# Patient Record
Sex: FEMALE | Race: OTHER | NOT HISPANIC OR LATINO | URBAN - METROPOLITAN AREA
[De-identification: names, ages, dates, MRNs, and addresses within clinical notes are randomized per-mention and may not be internally consistent; named-entity substitution may affect disease eponyms.]

---

## 2021-11-06 ENCOUNTER — EMERGENCY (EMERGENCY)
Facility: HOSPITAL | Age: 31
LOS: 1 days | Discharge: HOME | End: 2021-11-06
Admitting: EMERGENCY MEDICINE
Payer: COMMERCIAL

## 2021-11-06 VITALS
OXYGEN SATURATION: 99 % | HEART RATE: 82 BPM | RESPIRATION RATE: 16 BRPM | SYSTOLIC BLOOD PRESSURE: 116 MMHG | DIASTOLIC BLOOD PRESSURE: 75 MMHG | TEMPERATURE: 98 F

## 2021-11-06 VITALS
WEIGHT: 156.97 LBS | DIASTOLIC BLOOD PRESSURE: 83 MMHG | SYSTOLIC BLOOD PRESSURE: 128 MMHG | HEIGHT: 64 IN | HEART RATE: 107 BPM | RESPIRATION RATE: 16 BRPM | TEMPERATURE: 98 F | OXYGEN SATURATION: 99 %

## 2021-11-06 DIAGNOSIS — Z91.010 ALLERGY TO PEANUTS: ICD-10-CM
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
## 2021-11-06 DIAGNOSIS — S42.452A DISPLACED FRACTURE OF LATERAL CONDYLE OF LEFT HUMERUS, INITIAL ENCOUNTER FOR CLOSED FRACTURE: ICD-10-CM

## 2021-11-06 DIAGNOSIS — M25.522 PAIN IN LEFT ELBOW: ICD-10-CM

## 2021-11-06 PROCEDURE — 73080 X-RAY EXAM OF ELBOW: CPT | Mod: 26,LT

## 2021-11-06 PROCEDURE — 99284 EMERGENCY DEPT VISIT MOD MDM: CPT | Mod: 25

## 2021-11-06 PROCEDURE — 73090 X-RAY EXAM OF FOREARM: CPT

## 2021-11-06 PROCEDURE — 73090 X-RAY EXAM OF FOREARM: CPT | Mod: 26,LT

## 2021-11-06 PROCEDURE — 99284 EMERGENCY DEPT VISIT MOD MDM: CPT

## 2021-11-06 PROCEDURE — 73080 X-RAY EXAM OF ELBOW: CPT

## 2021-11-06 RX ORDER — TRAMADOL HYDROCHLORIDE 50 MG/1
50 TABLET ORAL ONCE
Refills: 0 | Status: DISCONTINUED | OUTPATIENT
Start: 2021-11-06 | End: 2021-11-06

## 2021-11-06 RX ADMIN — TRAMADOL HYDROCHLORIDE 50 MILLIGRAM(S): 50 TABLET ORAL at 04:16

## 2021-11-06 NOTE — ED PROVIDER NOTE - CLINICAL SUMMARY MEDICAL DECISION MAKING FREE TEXT BOX
32 yo female in the Er c/o left elbow pain s/p accidental fall in the bar tonight. pt reports she fell on her left elbow. Pt concerned because she had the same elbow fractured in 2018. Unable fully straighten her left arm due to pain. pt denies any numbness, tingling to her left fingers, denies any pain to left shoulder or wrist.  on exam- no deformity , no ecchymosis to left elbow joint, decreased ROM due to pain. plan: pain control, Xray.

## 2021-11-06 NOTE — ED PROVIDER NOTE - OBJECTIVE STATEMENT
32 yo female in the Er c/o left elbow pain s/p accidental fall in the bar tonight. pt reports she fell on her left elbow. Pt concerned because she had the same elbow fractured in 2018. Unable fully straighten her left arm due to pain. pt denies any numbness, tingling to her left fingers, denies any pain to left shoulder or wrist.

## 2021-11-06 NOTE — ED PROVIDER NOTE - MUSCULOSKELETAL, MLM
Spine appears normal,  no vertebral point of tenderness, left elbow tenderness, no deformity or discolorations,  decreased ROM due to pain, distal pulses good

## 2021-11-06 NOTE — CONSULT NOTE ADULT - SUBJECTIVE AND OBJECTIVE BOX
Orthopaedic Surgery Consult Note    Attending Physician: Kenny  Consult requested by: ED    CC: L elbow pain    HPI:  31yFemale healthy female s/p L radial head athroplasty two years ago, now presenting with L elbow pain s/p mechanical fall. Denies numbness/tingling, weakness of affected extremity.     Allergies    No Known Drug Allergies  peanuts (Unknown)    Intolerances      PAST MEDICAL & SURGICAL HISTORY:      Meds:      Family History:  Denies family history of bleeding disorders    Social History:   Pt is a nonsmoker  Social EtOH use     Review of Systems:  All review of systems are negative except for those mentioned in HPI.    Physical Exam:  General: Pt Alert and oriented, NAD  Elbow ROM hard to assess, due to pain, skin intact, minimal swelling  Pulses: 2+ radial pulses, fingers wwp, cap refill <3 seconds  Sensation: SILT radial/median/ulnar distributions  Motor: AIN/PIN/ulnar firing    Vital Signs Last 24 Hrs  T(C): 36.8 (06 Nov 2021 09:28), Max: 36.9 (06 Nov 2021 03:34)  T(F): 98.2 (06 Nov 2021 09:28), Max: 98.4 (06 Nov 2021 03:34)  HR: 82 (06 Nov 2021 09:28) (82 - 107)  BP: 116/75 (06 Nov 2021 09:28) (110/79 - 128/83)  BP(mean): --  RR: 16 (06 Nov 2021 09:28) (16 - 16)  SpO2: 99% (06 Nov 2021 09:28) (99% - 99%)      Labs:        Imaging:   L xray Elbow: minimally displaced L distal humerus fracture    A/P: 31yFemale healthy s/p L radial head arthroplasty now presenting with minimally displaced L distal radius fracture  - stable pain control  - placed in posterior slab splint with NWB sling  - no surgical intervention at this time.  - discharge with outpatient follow up with Dr. mccurdy this week  - Discussed with Attending, Dr. Kenny Miller, PGY-1  Ortho Pager [phone]

## 2021-11-06 NOTE — ED PROVIDER NOTE - PATIENT PORTAL LINK FT
You can access the FollowMyHealth Patient Portal offered by [facility] by registering at the following website: [email or web address]. By joining [facility]’s FollowMyHealth portal, you will also be able to view your health information using other applications (apps) compatible with our system.

## 2021-11-06 NOTE — ED PROVIDER NOTE - NSFOLLOWUPINSTRUCTIONS_ED_ALL_ED_FT
Please take motrin/tylenol as needed for pain. Return to the Emergency Department if you have any new or worsening symptoms, or if you have any concerns.    PLEASE FOLLOW UP WITH DR. SALDIVAR.     Please reach out to Margie Moss ([facility] ED clinical referral coordinator) to assist you with your follow-up appointment.     Monday - Friday 11am-7pm  [phone]  [email or web address]        Distal Humerus Elbow Fracture       A distal humerus elbow fracture is a break in the upper arm bone (humerus). The break happens in the lower (distal) part of the humerus, near the elbow.      What are the causes?  This condition may be caused by:  •A hard, direct hit. This may happen as a result of being hit with an object, being in a motor vehicle accident, or having a collision with another person.      •Falling onto an outstretched arm.        What increases the risk?  You are more likely to develop this condition if:  •You are young.      •You are elderly and have low bone density(osteoporosis).    •You participate in activities where you are likely to have an injury or a fall, such as:  •Gymnastics.      •Football and other contact sports.      •Skateboarding.      •Biking.          What are the signs or symptoms?  Symptoms of this condition include:  •Pain that is sudden and severe.      •Tenderness of the elbow as well as the area near the elbow.      •Inability to move the elbow or straighten the arm.      •Swelling of the elbow.      •Bruising.      •Stiffness.      •A feeling that the elbow is unstable.      In severe cases, the bone can protrude through the skin. This type of elbow fracture is a medical emergency.      How is this diagnosed?    This condition is diagnosed based on your symptoms, your medical history, and a physical exam.    You may also have X-rays to confirm the diagnosis.      How is this treated?  This condition may be treated by:  •Wearing a splint or cast and a sling to hold your elbow still (immobilized) while it heals.      •Applying ice to your elbow to relieve pain and reduce swelling.      •Taking NSAIDs, such as ibuprofen, to reduce pain and swelling.      •Doing exercises to help you regain movement (physical therapy).      •Having surgery if the bones in your elbow are out of position.        Follow these instructions at home:    Medicines     •Take over-the-counter and prescription medicines only as told by your health care provider.    •Ask your health care provider if the medicine prescribed to you:  •Requires you to avoid driving or using heavy machinery.    •Can cause constipation. You may need to take these actions to prevent or treat constipation:  •Drink enough fluid to keep your urine pale yellow.      •Take over-the-counter or prescription medicines.      •Eat foods that are high in fiber, such as beans, whole grains, and fresh fruits and vegetables.      •Limit foods that are high in fat and processed sugars, such as fried or sweet foods.          If you have a cast or splint:     • Do not put pressure on any part of the cast or splint until it is fully hardened. This may take several hours.      •Check the skin around it every day. Tell your health care provider about any concerns.      If you have a cast:     • Do not stick anything inside it to scratch your skin. Doing that increases your risk of infection.      •You may put lotion on dry skin around the edges of the cast. Do not put lotion on the skin underneath it.      •Keep it clean and dry.      If you have a splint or sling:     •Wear it as told by your health care provider. Remove it only as told by your health care provider.      •Loosen it if your fingers tingle, become numb, or turn cold and blue.      •Keep it clean and dry.      Bathing     • Do not take baths, swim, or use a hot tub until your health care provider approves. Ask your health care provider if you may take showers. You may only be allowed to take sponge baths.    •If the cast, splint, or sling is not waterproof:  •Do not let it get wet.      •Cover it with a waterproof covering when you take a bath or shower.          Managing pain, stiffness, and swelling    •If directed, put ice on the injured area.  •If you have a removable splint or sling, remove it as told by your health care provider.      •Put ice in a plastic bag.      •Place a towel between your skin and the bag or between your cast and the bag.      •Leave the ice on for 20 minutes, 2–3 times a day.        •Move your fingers often to reduce stiffness and swelling.      •Raise (elevate) the injured area above the level of your heart while you are sitting or lying down.      Activity     •Ask your health care provider when it is safe to drive if you have a cast, splint, or sling on your arm.      •Do exercises as told by your health care provider.      •Return to your normal activities as told by your health care provider. Ask your health care provider what activities are safe for you.      Safety     • Do not use the injured limb to support your body weight until your health care provider says that you can.      • Do not lift anything that is heavier than 10 lb (4.5 kg), or the limit that you are told, until your health care provider says that it is safe.      • Do not pull or push objects with your injured arm until your health care provider says that it is safe.      General instructions     • Do not use any products that contain nicotine or tobacco, such as cigarettes, e-cigarettes, and chewing tobacco. These can delay bone healing. If you need help quitting, ask your health care provider.      •Keep all follow-up visits as told by your health care provider. This is important.        How is this prevented?    •Wear the appropriate protective equipment during sports, such as elbow pads.      •Make sure to use equipment that fits you.    •Maintain physical fitness, including:  •Strength.      •Flexibility.          Contact a health care provider if you have:    •Pain that gets worse or does not improve.        Get help right away if you have:    •Ongoing numbness or weakness in your elbow, hand, or fingers.      •Discoloration of your fingers.      •Severe pain that is much worse or different than before.      •Swelling that suddenly worsens.        Summary    •A distal humerus elbow fracture is a break in the upper arm bone (humerus) near the elbow.      •This condition may be caused by a hard, direct hit or falling onto an outstretched arm.      •This condition is often treated with a splint or cast and a sling to hold your elbow still (immobilized) while it heals. Surgery may be needed if the bones in your elbow are out of position.      •Contact your health care provider if you have pain that gets worse or does not improve.      This information is not intended to replace advice given to you by your health care provider. Make sure you discuss any questions you have with your health care provider.      Document Revised: 04/08/2020 Document Reviewed: 02/26/2020    [facility] Patient Education © 2021 "[employer]

## 2021-11-06 NOTE — ED ADULT NURSE NOTE - OBJECTIVE STATEMENT
Received a 31 year old female with a chief complaint of pain to the left elbow following a mechanical trip and fall [place] the night club. Patient reports that she had  fractured the same site three years prior. Patient unable to extend left upper extremity - limited with pain.

## 2021-11-06 NOTE — ED PROVIDER NOTE - PROGRESS NOTE DETAILS
Pt seen by ortho and placed in splint. Advised to follow-up with Dr. Cox. I have discussed the discharge plan with the patient. The patient agrees with the plan, as discussed.  The patient understands Emergency Department diagnosis is a preliminary diagnosis often based on limited information and that the patient must adhere to the follow-up plan as discussed.  The patient understands that if the symptoms worsen or if prescribed medications do not have the desired/planned effect that the patient may return to the Emergency Department at any time for further evaluation and treatment.

## 2021-11-06 NOTE — ED ADULT NURSE NOTE - NSIMPLEMENTINTERV_GEN_ALL_ED
Implemented All Fall Risk Interventions:  [place] to call system. Call bell, personal items and telephone within reach. Instruct patient to call for assistance. Room bathroom lighting operational. Non-slip footwear when patient is off stretcher. Physically safe environment: no spills, clutter or unnecessary equipment. Stretcher in lowest position, wheels locked, appropriate side rails in place. Provide visual cue, wrist band, yellow gown, etc. Monitor gait and stability. Monitor for mental status changes and reorient to person, place, and time. Review medications for side effects contributing to fall risk. Reinforce activity limits and safety measures with patient and family.